# Patient Record
Sex: MALE | Race: WHITE
[De-identification: names, ages, dates, MRNs, and addresses within clinical notes are randomized per-mention and may not be internally consistent; named-entity substitution may affect disease eponyms.]

---

## 2017-12-19 ENCOUNTER — HOSPITAL ENCOUNTER (OUTPATIENT)
Dept: HOSPITAL 63 - PMG | Age: 67
Discharge: HOME | End: 2017-12-19
Attending: PHYSICIAN ASSISTANT
Payer: MEDICARE

## 2017-12-19 DIAGNOSIS — R05: Primary | ICD-10-CM

## 2017-12-19 DIAGNOSIS — R09.89: ICD-10-CM

## 2017-12-19 PROCEDURE — 71020: CPT

## 2017-12-19 NOTE — RAD
Indication: Cough and congestion.



Time of exam 0824 hours.



No prior studies are available for comparison.



FINDINGS: The heart size is normal. The lungs are clear. No pleural effusion

or pneumothorax is identified.  The pulmonary vascularity is normal.



IMPRESSION: No acute abnormality detected.

## 2018-02-08 ENCOUNTER — HOSPITAL ENCOUNTER (OUTPATIENT)
Dept: HOSPITAL 63 - US | Age: 68
Discharge: HOME | End: 2018-02-08
Attending: INTERNAL MEDICINE
Payer: MEDICARE

## 2018-02-08 DIAGNOSIS — E78.00: ICD-10-CM

## 2018-02-08 DIAGNOSIS — I65.23: Primary | ICD-10-CM

## 2018-02-08 PROCEDURE — 93880 EXTRACRANIAL BILAT STUDY: CPT

## 2018-02-08 NOTE — RAD
Carotid ultrasound, 2/8/2018:



History: Carotid stenosis



Duplex evaluation of the carotid arteries in neck was performed including

grayscale, color-flow and spectral Doppler analysis.



There is mild intimal thickening and smooth plaquing at both carotid

bifurcations.  Minimal plaque calcification is noted.  The peak systolic

velocity in the right internal carotid artery is 134 cm/s with an

end-diastolic velocity of 31 cm/s.  The internal carotid to common carotid

artery ratio is 1.0.  These Doppler findings suggest narrowing in the 0-50%

diameter range.



The peak systolic velocity in left internal carotid artery is 90 cm/s with an

end-diastolic velocity of 29 cm/s.  The internal carotid to common carotid

artery ratio on the left is 0.8.  These Doppler findings also suggest

narrowing in the 0-50% diameter range.



Antegrade flow is present in both vertebral arteries in the neck.





IMPRESSION: Mild atherosclerotic plaquing at both carotid bifurcations with

underlying luminal narrowing in the 0-50% diameter range bilaterally.





Note:  Stenosis calculations for CTA, MRA and conventional angiography are

based upon determination of the distal ICA diameter in accordance with the

NASCET methodology.  Stenosis calculations for Doppler studies are derived

from validated velocity criteria which are known to correlate with NASCET

methodology of determining stenosis.

## 2020-01-06 ENCOUNTER — HOSPITAL ENCOUNTER (OUTPATIENT)
Dept: HOSPITAL 63 - LAB | Age: 70
Discharge: HOME | End: 2020-01-06
Attending: INTERNAL MEDICINE
Payer: MEDICARE

## 2020-01-06 DIAGNOSIS — E78.00: ICD-10-CM

## 2020-01-06 DIAGNOSIS — I49.3: Primary | ICD-10-CM

## 2020-01-06 LAB
ALBUMIN SERPL-MCNC: 4.1 G/DL (ref 3.4–5)
ALBUMIN/GLOB SERPL: 1.4 {RATIO} (ref 1–1.7)
ALP SERPL-CCNC: 55 U/L (ref 46–116)
ALT SERPL-CCNC: 43 U/L (ref 16–63)
ANION GAP SERPL CALC-SCNC: 8 MMOL/L (ref 6–14)
AST SERPL-CCNC: 20 U/L (ref 15–37)
BILIRUB SERPL-MCNC: 1.4 MG/DL (ref 0.2–1)
BUN/CREAT SERPL: 26 (ref 6–20)
CA-I SERPL ISE-MCNC: 21 MG/DL (ref 8–26)
CALCIUM SERPL-MCNC: 9.1 MG/DL (ref 8.5–10.1)
CHLORIDE SERPL-SCNC: 107 MMOL/L (ref 98–107)
CHOLEST/HDLC SERPL: 2 {RATIO}
CO2 SERPL-SCNC: 26 MMOL/L (ref 21–32)
CREAT SERPL-MCNC: 0.8 MG/DL (ref 0.7–1.3)
GFR SERPLBLD BASED ON 1.73 SQ M-ARVRAT: 95.3 ML/MIN
GLOBULIN SER-MCNC: 3 G/DL (ref 2.2–3.8)
GLUCOSE SERPL-MCNC: 103 MG/DL (ref 70–99)
HDLC SERPL-MCNC: 56 MG/DL (ref 40–60)
LDLC: 77 MG/DL (ref 0–100)
MAGNESIUM SERPL-MCNC: 2.1 MG/DL (ref 1.8–2.4)
POTASSIUM SERPL-SCNC: 4.4 MMOL/L (ref 3.5–5.1)
PROT SERPL-MCNC: 7.1 G/DL (ref 6.4–8.2)
SODIUM SERPL-SCNC: 141 MMOL/L (ref 136–145)
TRIGL SERPL-MCNC: 82 MG/DL (ref 0–150)
VLDLC: 16 MG/DL (ref 0–40)

## 2020-01-06 PROCEDURE — 36415 COLL VENOUS BLD VENIPUNCTURE: CPT

## 2020-01-06 PROCEDURE — 83735 ASSAY OF MAGNESIUM: CPT

## 2020-01-06 PROCEDURE — 80053 COMPREHEN METABOLIC PANEL: CPT

## 2020-01-06 PROCEDURE — 80061 LIPID PANEL: CPT

## 2020-04-13 ENCOUNTER — HOSPITAL ENCOUNTER (OUTPATIENT)
Dept: HOSPITAL 63 - LAB | Age: 70
Discharge: HOME | End: 2020-04-13
Attending: INTERNAL MEDICINE
Payer: MEDICARE

## 2020-04-13 DIAGNOSIS — E78.00: Primary | ICD-10-CM

## 2020-04-13 LAB
ALBUMIN SERPL-MCNC: 4.1 G/DL (ref 3.4–5)
ALP SERPL-CCNC: 64 U/L (ref 46–116)
ALT SERPL-CCNC: 32 U/L (ref 16–63)
AST SERPL-CCNC: 17 U/L (ref 15–37)
BILIRUB DIRECT SERPL-MCNC: 0.2 MG/DL (ref 0–0.2)
BILIRUB SERPL-MCNC: 1.3 MG/DL (ref 0.2–1)
CHOLEST/HDLC SERPL: 2 {RATIO}
HDLC SERPL-MCNC: 54 MG/DL (ref 40–60)
LDLC: 69 MG/DL (ref 0–100)
PROT SERPL-MCNC: 7 G/DL (ref 6.4–8.2)
TRIGL SERPL-MCNC: 102 MG/DL (ref 0–150)
VLDLC: 20 MG/DL (ref 0–40)

## 2020-04-13 PROCEDURE — 36415 COLL VENOUS BLD VENIPUNCTURE: CPT

## 2020-04-13 PROCEDURE — 80076 HEPATIC FUNCTION PANEL: CPT

## 2020-04-13 PROCEDURE — 80061 LIPID PANEL: CPT

## 2020-08-14 ENCOUNTER — HOSPITAL ENCOUNTER (OUTPATIENT)
Dept: HOSPITAL 63 - LAB | Age: 70
Discharge: HOME | End: 2020-08-14
Attending: INTERNAL MEDICINE
Payer: MEDICARE

## 2020-08-14 DIAGNOSIS — E78.00: ICD-10-CM

## 2020-08-14 DIAGNOSIS — I35.0: Primary | ICD-10-CM

## 2020-08-14 LAB
ALBUMIN SERPL-MCNC: 4.1 G/DL (ref 3.4–5)
ALBUMIN/GLOB SERPL: 1.4 {RATIO} (ref 1–1.7)
ALP SERPL-CCNC: 55 U/L (ref 46–116)
ALT SERPL-CCNC: 38 U/L (ref 16–63)
ANION GAP SERPL CALC-SCNC: 10 MMOL/L (ref 6–14)
AST SERPL-CCNC: 19 U/L (ref 15–37)
BILIRUB SERPL-MCNC: 1.3 MG/DL (ref 0.2–1)
BUN/CREAT SERPL: 21 (ref 6–20)
CA-I SERPL ISE-MCNC: 19 MG/DL (ref 8–26)
CALCIUM SERPL-MCNC: 9.2 MG/DL (ref 8.5–10.1)
CHLORIDE SERPL-SCNC: 106 MMOL/L (ref 98–107)
CHOLEST/HDLC SERPL: 2 {RATIO}
CO2 SERPL-SCNC: 25 MMOL/L (ref 21–32)
CREAT SERPL-MCNC: 0.9 MG/DL (ref 0.7–1.3)
GFR SERPLBLD BASED ON 1.73 SQ M-ARVRAT: 82.9 ML/MIN
GLOBULIN SER-MCNC: 3 G/DL (ref 2.2–3.8)
GLUCOSE SERPL-MCNC: 114 MG/DL (ref 70–99)
HDLC SERPL-MCNC: 56 MG/DL (ref 40–60)
LDLC: 59 MG/DL (ref 0–100)
POTASSIUM SERPL-SCNC: 4.5 MMOL/L (ref 3.5–5.1)
PROT SERPL-MCNC: 7.1 G/DL (ref 6.4–8.2)
SODIUM SERPL-SCNC: 141 MMOL/L (ref 136–145)
TRIGL SERPL-MCNC: 66 MG/DL (ref 0–150)
VLDLC: 13 MG/DL (ref 0–40)

## 2020-08-14 PROCEDURE — 80053 COMPREHEN METABOLIC PANEL: CPT

## 2020-08-14 PROCEDURE — 80061 LIPID PANEL: CPT

## 2020-08-14 PROCEDURE — 83695 ASSAY OF LIPOPROTEIN(A): CPT

## 2021-01-25 ENCOUNTER — HOSPITAL ENCOUNTER (INPATIENT)
Dept: HOSPITAL 63 - 1 SOUTH | Age: 71
LOS: 7 days | Discharge: HOME HEALTH | DRG: 177 | End: 2021-02-01
Attending: INTERNAL MEDICINE | Admitting: HOSPITALIST
Payer: MEDICARE

## 2021-01-25 VITALS — BODY MASS INDEX: 25.42 KG/M2 | WEIGHT: 191.8 LBS | HEIGHT: 73 IN

## 2021-01-25 VITALS — DIASTOLIC BLOOD PRESSURE: 71 MMHG | SYSTOLIC BLOOD PRESSURE: 115 MMHG

## 2021-01-25 VITALS — SYSTOLIC BLOOD PRESSURE: 121 MMHG | DIASTOLIC BLOOD PRESSURE: 70 MMHG

## 2021-01-25 DIAGNOSIS — E78.5: ICD-10-CM

## 2021-01-25 DIAGNOSIS — I35.0: ICD-10-CM

## 2021-01-25 DIAGNOSIS — J12.82: ICD-10-CM

## 2021-01-25 DIAGNOSIS — I11.0: ICD-10-CM

## 2021-01-25 DIAGNOSIS — I50.9: ICD-10-CM

## 2021-01-25 DIAGNOSIS — J84.89: ICD-10-CM

## 2021-01-25 DIAGNOSIS — Z79.899: ICD-10-CM

## 2021-01-25 DIAGNOSIS — Z82.49: ICD-10-CM

## 2021-01-25 DIAGNOSIS — J96.01: ICD-10-CM

## 2021-01-25 DIAGNOSIS — U07.1: Primary | ICD-10-CM

## 2021-01-25 DIAGNOSIS — Z90.49: ICD-10-CM

## 2021-01-25 DIAGNOSIS — N40.0: ICD-10-CM

## 2021-01-25 LAB
ALBUMIN SERPL-MCNC: 2.7 G/DL (ref 3.4–5)
ALBUMIN/GLOB SERPL: 0.6 {RATIO} (ref 1–1.7)
ALP SERPL-CCNC: 103 U/L (ref 46–116)
ALT SERPL-CCNC: 221 U/L (ref 16–63)
ANION GAP SERPL CALC-SCNC: 10 MMOL/L (ref 6–14)
AST SERPL-CCNC: 64 U/L (ref 15–37)
BASOPHILS # BLD AUTO: 0.1 X10^3/UL (ref 0–0.2)
BASOPHILS NFR BLD: 1 % (ref 0–3)
BILIRUB SERPL-MCNC: 0.8 MG/DL (ref 0.2–1)
BUN/CREAT SERPL: 18 (ref 6–20)
CA-I SERPL ISE-MCNC: 16 MG/DL (ref 8–26)
CALCIUM SERPL-MCNC: 8.9 MG/DL (ref 8.5–10.1)
CHLORIDE SERPL-SCNC: 103 MMOL/L (ref 98–107)
CO2 SERPL-SCNC: 23 MMOL/L (ref 21–32)
CREAT SERPL-MCNC: 0.9 MG/DL (ref 0.7–1.3)
EOSINOPHIL NFR BLD: 0.1 X10^3/UL (ref 0–0.7)
EOSINOPHIL NFR BLD: 1 % (ref 0–3)
ERYTHROCYTE [DISTWIDTH] IN BLOOD BY AUTOMATED COUNT: 12.6 % (ref 11.5–14.5)
GFR SERPLBLD BASED ON 1.73 SQ M-ARVRAT: 82.9 ML/MIN
GLOBULIN SER-MCNC: 4.3 G/DL (ref 2.2–3.8)
GLUCOSE SERPL-MCNC: 123 MG/DL (ref 70–99)
HCT VFR BLD CALC: 40.9 % (ref 39–53)
HGB BLD-MCNC: 13.7 G/DL (ref 13–17.5)
INFLUENZA A PATIENT: NEGATIVE
INFLUENZA B PATIENT: NEGATIVE
LYMPHOCYTES # BLD: 1 X10^3/UL (ref 1–4.8)
LYMPHOCYTES NFR BLD AUTO: 8 % (ref 24–48)
MCH RBC QN AUTO: 30 PG (ref 25–35)
MCHC RBC AUTO-ENTMCNC: 34 G/DL (ref 31–37)
MCV RBC AUTO: 91 FL (ref 79–100)
MONO #: 0.7 X10^3/UL (ref 0–1.1)
MONOCYTES NFR BLD: 5 % (ref 0–9)
NEUT #: 10.9 X10^3UL (ref 1.8–7.7)
NEUTROPHILS NFR BLD AUTO: 85 % (ref 31–73)
PLATELET # BLD AUTO: 474 X10^3/UL (ref 140–400)
POTASSIUM SERPL-SCNC: 4.1 MMOL/L (ref 3.5–5.1)
PROT SERPL-MCNC: 7 G/DL (ref 6.4–8.2)
RBC # BLD AUTO: 4.5 X10^6/UL (ref 4.3–5.7)
SODIUM SERPL-SCNC: 136 MMOL/L (ref 136–145)
WBC # BLD AUTO: 12.8 X10^3/UL (ref 4–11)

## 2021-01-25 PROCEDURE — U0003 INFECTIOUS AGENT DETECTION BY NUCLEIC ACID (DNA OR RNA); SEVERE ACUTE RESPIRATORY SYNDROME CORONAVIRUS 2 (SARS-COV-2) (CORONAVIRUS DISEASE [COVID-19]), AMPLIFIED PROBE TECHNIQUE, MAKING USE OF HIGH THROUGHPUT TECHNOLOGIES AS DESCRIBED BY CMS-2020-01-R: HCPCS

## 2021-01-25 PROCEDURE — 94618 PULMONARY STRESS TESTING: CPT

## 2021-01-25 PROCEDURE — 71045 X-RAY EXAM CHEST 1 VIEW: CPT

## 2021-01-25 PROCEDURE — 71046 X-RAY EXAM CHEST 2 VIEWS: CPT

## 2021-01-25 PROCEDURE — 87040 BLOOD CULTURE FOR BACTERIA: CPT

## 2021-01-25 PROCEDURE — 86140 C-REACTIVE PROTEIN: CPT

## 2021-01-25 PROCEDURE — 87804 INFLUENZA ASSAY W/OPTIC: CPT

## 2021-01-25 PROCEDURE — 85379 FIBRIN DEGRADATION QUANT: CPT

## 2021-01-25 PROCEDURE — 85007 BL SMEAR W/DIFF WBC COUNT: CPT

## 2021-01-25 PROCEDURE — 71275 CT ANGIOGRAPHY CHEST: CPT

## 2021-01-25 PROCEDURE — 36415 COLL VENOUS BLD VENIPUNCTURE: CPT

## 2021-01-25 PROCEDURE — 83880 ASSAY OF NATRIURETIC PEPTIDE: CPT

## 2021-01-25 PROCEDURE — 80048 BASIC METABOLIC PNL TOTAL CA: CPT

## 2021-01-25 PROCEDURE — 85025 COMPLETE CBC W/AUTO DIFF WBC: CPT

## 2021-01-25 PROCEDURE — 85027 COMPLETE CBC AUTOMATED: CPT

## 2021-01-25 PROCEDURE — 80053 COMPREHEN METABOLIC PANEL: CPT

## 2021-01-25 RX ADMIN — IPRATROPIUM BROMIDE AND ALBUTEROL SCH PUFF: 20; 100 SPRAY, METERED RESPIRATORY (INHALATION) at 20:42

## 2021-01-25 RX ADMIN — DEXAMETHASONE SODIUM PHOSPHATE SCH MG: 10 INJECTION INTRAMUSCULAR; INTRAVENOUS at 20:43

## 2021-01-25 RX ADMIN — AZITHROMYCIN DIHYDRATE SCH MLS/HR: 500 INJECTION, POWDER, LYOPHILIZED, FOR SOLUTION INTRAVENOUS at 21:18

## 2021-01-26 VITALS — SYSTOLIC BLOOD PRESSURE: 124 MMHG | DIASTOLIC BLOOD PRESSURE: 69 MMHG

## 2021-01-26 VITALS — SYSTOLIC BLOOD PRESSURE: 128 MMHG | DIASTOLIC BLOOD PRESSURE: 77 MMHG

## 2021-01-26 VITALS — DIASTOLIC BLOOD PRESSURE: 78 MMHG | SYSTOLIC BLOOD PRESSURE: 128 MMHG

## 2021-01-26 VITALS — SYSTOLIC BLOOD PRESSURE: 133 MMHG | DIASTOLIC BLOOD PRESSURE: 78 MMHG

## 2021-01-26 VITALS — DIASTOLIC BLOOD PRESSURE: 74 MMHG | SYSTOLIC BLOOD PRESSURE: 115 MMHG

## 2021-01-26 VITALS — SYSTOLIC BLOOD PRESSURE: 120 MMHG | DIASTOLIC BLOOD PRESSURE: 73 MMHG

## 2021-01-26 RX ADMIN — IPRATROPIUM BROMIDE AND ALBUTEROL SCH PUFF: 20; 100 SPRAY, METERED RESPIRATORY (INHALATION) at 09:34

## 2021-01-26 RX ADMIN — PSEUDOEPHEDRINE HYDROCHLORIDE SCH MG: 120 TABLET, FILM COATED, EXTENDED RELEASE ORAL at 14:00

## 2021-01-26 RX ADMIN — IPRATROPIUM BROMIDE AND ALBUTEROL SCH PUFF: 20; 100 SPRAY, METERED RESPIRATORY (INHALATION) at 20:00

## 2021-01-26 RX ADMIN — Medication SCH MCG: at 14:23

## 2021-01-26 RX ADMIN — FINASTERIDE SCH MG: 5 TABLET, FILM COATED ORAL at 20:30

## 2021-01-26 RX ADMIN — PSEUDOEPHEDRINE HYDROCHLORIDE SCH MG: 120 TABLET, FILM COATED, EXTENDED RELEASE ORAL at 20:32

## 2021-01-26 RX ADMIN — MULTIPLE VITAMINS W/ MINERALS TAB SCH TAB: TAB at 14:22

## 2021-01-26 RX ADMIN — DEXAMETHASONE SODIUM PHOSPHATE SCH MG: 10 INJECTION INTRAMUSCULAR; INTRAVENOUS at 09:34

## 2021-01-26 RX ADMIN — AZITHROMYCIN DIHYDRATE SCH MLS/HR: 500 INJECTION, POWDER, LYOPHILIZED, FOR SOLUTION INTRAVENOUS at 20:30

## 2021-01-26 RX ADMIN — ATORVASTATIN CALCIUM SCH MG: 20 TABLET, FILM COATED ORAL at 20:30

## 2021-01-26 RX ADMIN — GUAIFENESIN AND DEXTROMETHORPHAN HYDROBROMIDE PRN TAB: 600; 30 TABLET, EXTENDED RELEASE ORAL at 14:23

## 2021-01-26 RX ADMIN — DEXAMETHASONE SODIUM PHOSPHATE SCH MG: 10 INJECTION INTRAMUSCULAR; INTRAVENOUS at 20:30

## 2021-01-26 RX ADMIN — IPRATROPIUM BROMIDE AND ALBUTEROL SCH PUFF: 20; 100 SPRAY, METERED RESPIRATORY (INHALATION) at 16:00

## 2021-01-26 RX ADMIN — CETIRIZINE HYDROCHLORIDE SCH MG: 10 TABLET, FILM COATED ORAL at 14:22

## 2021-01-26 RX ADMIN — Medication SCH PKT: at 20:32

## 2021-01-26 RX ADMIN — IPRATROPIUM BROMIDE AND ALBUTEROL SCH PUFF: 20; 100 SPRAY, METERED RESPIRATORY (INHALATION) at 12:00

## 2021-01-26 RX ADMIN — VITAMIN D, TAB 1000IU (100/BT) SCH UNIT: 25 TAB at 14:23

## 2021-01-26 RX ADMIN — ASPIRIN 81 MG SCH MG: 81 TABLET ORAL at 14:22

## 2021-01-26 RX ADMIN — ACETAMINOPHEN PRN MG: 325 TABLET, FILM COATED ORAL at 14:23

## 2021-01-26 RX ADMIN — TAMSULOSIN HYDROCHLORIDE SCH MG: 0.4 CAPSULE ORAL at 20:30

## 2021-01-26 NOTE — HP
ADMIT DATE:  



ATTENDING PHYSICIAN:  Dr. Reeves.



CHIEF COMPLAINT:  Shortness of breath.



HISTORY OF PRESENT ILLNESS:  The patient is a 70-year-old gentleman, very

active, comes in with weakness and shortness of breath.  He was tested positive

for coronavirus 11 days ago.  Chest x-ray demonstrated chronic interstitial

changes as well as new diffuse interstitial ground glass appearance in both

bases.  The patient is therefore admitted with suspected coronavirus pneumonia. 

Influenza A and B has been ordered too.



This gentleman has been sick for the last 4 days.  Not much appetite.  No

fevers, chills, cough.  Just malaise, some myalgias, poor appetite.  He had some

dyspnea.  No cough.  However, by the time I saw him, his room air saturations

were quite adequate.



PAST MEDICAL HISTORY:  Significant for gallbladder surgery.  He had essential

hypertension, hyperlipidemia and some prostatism.



CURRENT MEDICATIONS:  Include the following:  Aspirin, Lipitor, Coreg, vitamin

D3, B12, cyclobenzaprine, diltiazem 180, Proscar, multivitamin, and Flomax.



ALLERGIES:  He has no known drug allergies.



SOCIAL HISTORY:  Nonsmoker, nondrinker.



SOCIAL HISTORY:  He is retired.



FAMILY HISTORY:  Mom lived at age 97.  Dad  at age 71 of complications of

drug-induced cirrhosis of liver from rheumatoid arthritis history.  He is

.  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  He is still active and works at farms

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



REVIEW OF SYSTEMS:  Significant for the issues that brought him here.  He has

been fairly active.  He was feeling a little better by the time I saw him the

next day.  All other systems reviewed and determined to be negative.



PHYSICAL EXAMINATION:

GENERAL:  When I saw him, this is a pleasant, middle-aged gentleman.

INITIAL VITAL SIGNS:  Showed a blood pressure 120/73, pulse 88 and regular,

temperature 97.9 degrees Fahrenheit, and his oxygen saturations were 94% on room

air.

HEENT:  Head is without trauma.  Pupils are reactive.  Sclerae nonicteric. 

Oropharynx is clear.

NECK:  Supple, no bruits identified.

LUNGS:  Coarse rhonchi at the bases.

CARDIOVASCULAR:  Showed regular heart tones.  No gallops.

ABDOMEN:  Soft, no guarding or rebound tenderness.

EXTREMITIES:  Showed no cyanosis or edema.

NEUROLOGIC:  Focally intact.  Speech is fluent.   symmetrical.

SKIN:  Warm and dry.



IMAGING:  Chest x-ray as noted.



PERTINENT LABORATORY STUDIES:  The hemoglobin is 13.7 g/dL with a white count of

12,800.  Electrolytes, BUN and creatinine all within normal range. 

Transaminases are slightly elevated with ALT of 221, AST of 64, bilirubin is

normal.  Nonfasting blood sugar 123 mg/dL, creatinine is 0.9 mg/dL.



ASSESSMENT:

1.  A 70-year-old gentleman with bilateral atypical pneumonia, both bases.

2.  Interstitial lung disease.

3.  Probable COVID-19 pneumonia infection.

4.  Essential hypertension.

5.  Hyperlipidemia.



PLAN:

1.  Admit to the inpatient unit.

2.  Supplemental oxygen as needed.

3.  Empiric intravenous antibiotics, Rocephin and Zithromax have been ordered.

4.  Empiric Decadron.

5.  Diet as tolerated.

6.  Influenza A and B swab.

7.  Repeat coronavirus swab.





______________________________

CATRACHITO REEVES MD



DR:  YESSICA/jeremy  JOB#:  [id number] / [id number]

DD:  2021 09:01  DT:  2021 09:36



MARY Peerz

## 2021-01-27 VITALS — DIASTOLIC BLOOD PRESSURE: 74 MMHG | SYSTOLIC BLOOD PRESSURE: 122 MMHG

## 2021-01-27 VITALS — SYSTOLIC BLOOD PRESSURE: 138 MMHG | DIASTOLIC BLOOD PRESSURE: 77 MMHG

## 2021-01-27 VITALS — SYSTOLIC BLOOD PRESSURE: 146 MMHG | DIASTOLIC BLOOD PRESSURE: 79 MMHG

## 2021-01-27 VITALS — DIASTOLIC BLOOD PRESSURE: 75 MMHG | SYSTOLIC BLOOD PRESSURE: 138 MMHG

## 2021-01-27 VITALS — DIASTOLIC BLOOD PRESSURE: 74 MMHG | SYSTOLIC BLOOD PRESSURE: 115 MMHG

## 2021-01-27 LAB
% LYMPHS: 5 % (ref 24–48)
% MONOS: 4 % (ref 0–10)
% SEGS: 91 % (ref 35–66)
ALBUMIN SERPL-MCNC: 2.7 G/DL (ref 3.4–5)
ALBUMIN/GLOB SERPL: 0.7 {RATIO} (ref 1–1.7)
ALP SERPL-CCNC: 85 U/L (ref 46–116)
ALT SERPL-CCNC: 188 U/L (ref 16–63)
ANION GAP SERPL CALC-SCNC: 9 MMOL/L (ref 6–14)
AST SERPL-CCNC: 41 U/L (ref 15–37)
BASOPHILS # BLD AUTO: 0 X10^3/UL (ref 0–0.2)
BASOPHILS NFR BLD: 0 % (ref 0–3)
BILIRUB SERPL-MCNC: 0.5 MG/DL (ref 0.2–1)
BUN/CREAT SERPL: 19 (ref 6–20)
CA-I SERPL ISE-MCNC: 17 MG/DL (ref 8–26)
CALCIUM SERPL-MCNC: 8.8 MG/DL (ref 8.5–10.1)
CHLORIDE SERPL-SCNC: 104 MMOL/L (ref 98–107)
CO2 SERPL-SCNC: 24 MMOL/L (ref 21–32)
CREAT SERPL-MCNC: 0.9 MG/DL (ref 0.7–1.3)
EOSINOPHIL NFR BLD: 0 % (ref 0–3)
EOSINOPHIL NFR BLD: 0 X10^3/UL (ref 0–0.7)
ERYTHROCYTE [DISTWIDTH] IN BLOOD BY AUTOMATED COUNT: 13.2 % (ref 11.5–14.5)
GFR SERPLBLD BASED ON 1.73 SQ M-ARVRAT: 82.9 ML/MIN
GLOBULIN SER-MCNC: 4.1 G/DL (ref 2.2–3.8)
GLUCOSE SERPL-MCNC: 152 MG/DL (ref 70–99)
HCT VFR BLD CALC: 38.3 % (ref 39–53)
HGB BLD-MCNC: 13 G/DL (ref 13–17.5)
LYMPHOCYTES # BLD: 1.1 X10^3/UL (ref 1–4.8)
LYMPHOCYTES NFR BLD AUTO: 7 % (ref 24–48)
MCH RBC QN AUTO: 30 PG (ref 25–35)
MCHC RBC AUTO-ENTMCNC: 34 G/DL (ref 31–37)
MCV RBC AUTO: 89 FL (ref 79–100)
MONO #: 0.5 X10^3/UL (ref 0–1.1)
MONOCYTES NFR BLD: 3 % (ref 0–9)
NEUT #: 15 X10^3UL (ref 1.8–7.7)
NEUTROPHILS NFR BLD AUTO: 90 % (ref 31–73)
PLATELET # BLD AUTO: 407 X10^3/UL (ref 140–400)
PLATELET # BLD EST: (no result) 10*3/UL
POTASSIUM SERPL-SCNC: 4.5 MMOL/L (ref 3.5–5.1)
PROT SERPL-MCNC: 6.8 G/DL (ref 6.4–8.2)
RBC # BLD AUTO: 4.28 X10^6/UL (ref 4.3–5.7)
SODIUM SERPL-SCNC: 137 MMOL/L (ref 136–145)
TOXIC GRANULES BLD QL SMEAR: PRESENT
WBC # BLD AUTO: 16.6 X10^3/UL (ref 4–11)
WBC TOXIC VACUOLES BLD QL SMEAR: PRESENT

## 2021-01-27 RX ADMIN — CETIRIZINE HYDROCHLORIDE SCH MG: 10 TABLET, FILM COATED ORAL at 08:31

## 2021-01-27 RX ADMIN — ASPIRIN 81 MG SCH MG: 81 TABLET ORAL at 08:32

## 2021-01-27 RX ADMIN — IPRATROPIUM BROMIDE AND ALBUTEROL SCH PUFF: 20; 100 SPRAY, METERED RESPIRATORY (INHALATION) at 16:00

## 2021-01-27 RX ADMIN — VITAMIN D, TAB 1000IU (100/BT) SCH UNIT: 25 TAB at 08:31

## 2021-01-27 RX ADMIN — Medication SCH MCG: at 08:32

## 2021-01-27 RX ADMIN — AZITHROMYCIN DIHYDRATE SCH MLS/HR: 500 INJECTION, POWDER, LYOPHILIZED, FOR SOLUTION INTRAVENOUS at 22:15

## 2021-01-27 RX ADMIN — FINASTERIDE SCH MG: 5 TABLET, FILM COATED ORAL at 20:52

## 2021-01-27 RX ADMIN — DEXAMETHASONE SODIUM PHOSPHATE SCH MG: 10 INJECTION INTRAMUSCULAR; INTRAVENOUS at 20:53

## 2021-01-27 RX ADMIN — ACETAMINOPHEN PRN MG: 325 TABLET, FILM COATED ORAL at 20:53

## 2021-01-27 RX ADMIN — IPRATROPIUM BROMIDE AND ALBUTEROL SCH PUFF: 20; 100 SPRAY, METERED RESPIRATORY (INHALATION) at 20:00

## 2021-01-27 RX ADMIN — Medication SCH PKT: at 08:32

## 2021-01-27 RX ADMIN — MULTIPLE VITAMINS W/ MINERALS TAB SCH TAB: TAB at 08:32

## 2021-01-27 RX ADMIN — Medication SCH CAP: at 20:52

## 2021-01-27 RX ADMIN — IPRATROPIUM BROMIDE AND ALBUTEROL SCH PUFF: 20; 100 SPRAY, METERED RESPIRATORY (INHALATION) at 12:00

## 2021-01-27 RX ADMIN — PSEUDOEPHEDRINE HYDROCHLORIDE SCH MG: 120 TABLET, FILM COATED, EXTENDED RELEASE ORAL at 08:33

## 2021-01-27 RX ADMIN — IPRATROPIUM BROMIDE AND ALBUTEROL SCH PUFF: 20; 100 SPRAY, METERED RESPIRATORY (INHALATION) at 08:31

## 2021-01-27 RX ADMIN — ATORVASTATIN CALCIUM SCH MG: 20 TABLET, FILM COATED ORAL at 20:52

## 2021-01-27 RX ADMIN — Medication SCH CAP: at 09:00

## 2021-01-27 RX ADMIN — PSEUDOEPHEDRINE HYDROCHLORIDE SCH MG: 120 TABLET, FILM COATED, EXTENDED RELEASE ORAL at 20:53

## 2021-01-27 RX ADMIN — TAMSULOSIN HYDROCHLORIDE SCH MG: 0.4 CAPSULE ORAL at 20:52

## 2021-01-27 RX ADMIN — DEXAMETHASONE SODIUM PHOSPHATE SCH MG: 10 INJECTION INTRAMUSCULAR; INTRAVENOUS at 08:31

## 2021-01-27 RX ADMIN — Medication SCH PKT: at 20:52

## 2021-01-27 NOTE — RAD
XR CHEST 1V



Clinical Indication: Reason: increased SOA covid related / 



Comparison:  Two-view chest, 2 days ago.



Findings: 

The cardiomediastinal silhouette is normal. Relatively diffuse interstitial and alveolar opacities in
 the lungs are unchanged.

There is no pneumothorax. No pleural effusion is appreciated. No acute bone abnormality.



IMPRESSION: 

Bilateral infiltrates are unchanged.





Electronically signed by: Bhavesh Cartre MD (1/27/2021 7:08 AM) [facility]

## 2021-01-27 NOTE — PN
DATE:  01/27/2021



SUBJECTIVE:  The patient is a 70-year-old  male patient who was

admitted with shortness of breath.  He apparently tested positive for

coronavirus 12 days ago and his x-ray demonstrated chronic interstitial changes

as well as new diffuse interstitial ground glass appearance in both bases and

therefore was admitted for suspected coronavirus pneumonia, influenza A and B

has been ordered too.  Apparently, his influenza A and B were negative and his

coronavirus by PCR was not detectable.  He was started on IV antibiotic in the

form of Rocephin and Zithromax as well as dexamethasone and when I saw him

today, he was continued to complain of shortness of breath.  He was unable to

finish sentences and has to pause for every now and then and clearly short of

breath, has also cough with whitish sputum with streaks of blood.  Denied any

chest pain.



PHYSICAL EXAMINATION:

GENERAL:  When I saw him this afternoon, he looked well, slightly pale, no

jaundice, cyanosis or thyromegaly.  No jugular venous distention or limb edema.

VITAL SIGNS:  Her heart rate was 107, blood pressure was 146/79, temperature was

99.4, respiratory rate 20, and oxygen saturation was 94% on room air.

HEAD, EYES, EARS, NOSE AND THROAT:  Showed normocephalic, atraumatic.

NECK:  Supple.

HEART:  Normal first and second heart sounds with harsh ejection systolic murmur

best heard in the second right intercostal space, radiating to both carotids.

CHEST:  Shows central trachea, equal bilateral expansion, air entry, vesicular

breath sounds.  I could not really appreciate any crepitation or rhonchi.

ABDOMEN:  Slightly distended, soft, nontender.

NEUROLOGIC:  He is awake, alert, responding appropriately.  All cranial nerves

are intact.  He moves extremities without difficulty, ambulates without

assistance or assistive devices.



His intake over the last 24 hours was 940, no output was recorded.



LABORATORY DATA:  His lab work this morning showed white cell count of 16,600,

hemoglobin 13, hematocrit 38, MCV 89 and platelet count of 407,000 with a manual

differential showed 90% polymorphs, 7% lymphocytes and 3% monocytes.  Serum

sodium was 137, potassium 4.5, chloride 104, bicarbonate 24, anion gap of 9, BUN

17, creatinine 0.9, estimated GFR was 83 mL per minute.  His glucose was 152,

calcium was 8.8.  Total bilirubin and alkaline phosphatase is normal.  AST, ALT

slightly elevated, but trending down.  His total protein was 6.8, albumin was

2.7.  His chest x-ray showed the cardiomediastinal silhouette is normal

relatively diffuse interstitial and alveolar opacities in lungs that are

unchanged.  There is no pneumothorax, no pleural effusion is appreciated, no

acute bony abnormality.



ASSESSMENT:

1.  COVID pneumonia.

2.  Bilateral atypical pneumonia.

3.  Chronic interstitial lung disease.

4.  Hypertension.

5.  Hyperlipidemia.



PLAN:  To continue with IV antibiotic in the form of Rocephin and Zithromax,

continue with Decadron.  We will obviously start him on oxygen supplementation

as needed.  Meanwhile, we will continue with his tamsulosin and finasteride for

benign prostatic hypertrophy and diltiazem for hypertension.  The patient is

known to have aortic valve disease followed by Dr. Herman.  The patient seems to

be short of breath.  I will repeat his lab works including a D-dimer and BMP.  I

will consult also the cardiologist.  The patient does not seem to be hypoxic at

rest, but he definitely has short of breath more than usual according to him, it

is clearly he is unable to finish sentences, which according to him this not his

usual state of health.





______________________________

JANI RAMIREZ MD DR:  HARJEET/jeremy  JOB#:  [id number] / [id number]

DD:  01/27/2021 13:08  DT:  01/27/2021 17:44

## 2021-01-28 VITALS — SYSTOLIC BLOOD PRESSURE: 128 MMHG | DIASTOLIC BLOOD PRESSURE: 75 MMHG

## 2021-01-28 VITALS — DIASTOLIC BLOOD PRESSURE: 68 MMHG | SYSTOLIC BLOOD PRESSURE: 127 MMHG

## 2021-01-28 VITALS — SYSTOLIC BLOOD PRESSURE: 129 MMHG | DIASTOLIC BLOOD PRESSURE: 75 MMHG

## 2021-01-28 VITALS — SYSTOLIC BLOOD PRESSURE: 122 MMHG | DIASTOLIC BLOOD PRESSURE: 68 MMHG

## 2021-01-28 VITALS — SYSTOLIC BLOOD PRESSURE: 127 MMHG | DIASTOLIC BLOOD PRESSURE: 73 MMHG

## 2021-01-28 LAB
ALBUMIN SERPL-MCNC: 2.6 G/DL (ref 3.4–5)
ALBUMIN/GLOB SERPL: 0.7 {RATIO} (ref 1–1.7)
ALP SERPL-CCNC: 78 U/L (ref 46–116)
ALT SERPL-CCNC: 187 U/L (ref 16–63)
ANION GAP SERPL CALC-SCNC: 9 MMOL/L (ref 6–14)
AST SERPL-CCNC: 42 U/L (ref 15–37)
BILIRUB SERPL-MCNC: 0.5 MG/DL (ref 0.2–1)
BUN/CREAT SERPL: 22 (ref 6–20)
CA-I SERPL ISE-MCNC: 20 MG/DL (ref 8–26)
CALCIUM SERPL-MCNC: 8.7 MG/DL (ref 8.5–10.1)
CHLORIDE SERPL-SCNC: 104 MMOL/L (ref 98–107)
CO2 SERPL-SCNC: 23 MMOL/L (ref 21–32)
CREAT SERPL-MCNC: 0.9 MG/DL (ref 0.7–1.3)
GFR SERPLBLD BASED ON 1.73 SQ M-ARVRAT: 82.9 ML/MIN
GLOBULIN SER-MCNC: 3.8 G/DL (ref 2.2–3.8)
GLUCOSE SERPL-MCNC: 136 MG/DL (ref 70–99)
POTASSIUM SERPL-SCNC: 4.8 MMOL/L (ref 3.5–5.1)
PROT SERPL-MCNC: 6.4 G/DL (ref 6.4–8.2)
SODIUM SERPL-SCNC: 136 MMOL/L (ref 136–145)

## 2021-01-28 RX ADMIN — ATORVASTATIN CALCIUM SCH MG: 20 TABLET, FILM COATED ORAL at 20:47

## 2021-01-28 RX ADMIN — AZITHROMYCIN DIHYDRATE SCH MLS/HR: 500 INJECTION, POWDER, LYOPHILIZED, FOR SOLUTION INTRAVENOUS at 21:57

## 2021-01-28 RX ADMIN — Medication SCH MCG: at 10:07

## 2021-01-28 RX ADMIN — VITAMIN D, TAB 1000IU (100/BT) SCH UNIT: 25 TAB at 10:07

## 2021-01-28 RX ADMIN — DEXAMETHASONE SODIUM PHOSPHATE SCH MG: 10 INJECTION INTRAMUSCULAR; INTRAVENOUS at 20:47

## 2021-01-28 RX ADMIN — PSEUDOEPHEDRINE HYDROCHLORIDE SCH MG: 120 TABLET, FILM COATED, EXTENDED RELEASE ORAL at 10:08

## 2021-01-28 RX ADMIN — DEXAMETHASONE SODIUM PHOSPHATE SCH MG: 10 INJECTION INTRAMUSCULAR; INTRAVENOUS at 10:09

## 2021-01-28 RX ADMIN — GUAIFENESIN AND DEXTROMETHORPHAN HYDROBROMIDE PRN TAB: 600; 30 TABLET, EXTENDED RELEASE ORAL at 10:11

## 2021-01-28 RX ADMIN — Medication SCH PKT: at 20:47

## 2021-01-28 RX ADMIN — ASPIRIN 81 MG SCH MG: 81 TABLET ORAL at 10:06

## 2021-01-28 RX ADMIN — CETIRIZINE HYDROCHLORIDE SCH MG: 10 TABLET, FILM COATED ORAL at 10:07

## 2021-01-28 RX ADMIN — AZITHROMYCIN DIHYDRATE SCH MLS/HR: 500 INJECTION, POWDER, LYOPHILIZED, FOR SOLUTION INTRAVENOUS at 21:47

## 2021-01-28 RX ADMIN — Medication SCH CAP: at 10:11

## 2021-01-28 RX ADMIN — IPRATROPIUM BROMIDE AND ALBUTEROL SCH PUFF: 20; 100 SPRAY, METERED RESPIRATORY (INHALATION) at 08:00

## 2021-01-28 RX ADMIN — IPRATROPIUM BROMIDE AND ALBUTEROL SCH PUFF: 20; 100 SPRAY, METERED RESPIRATORY (INHALATION) at 20:00

## 2021-01-28 RX ADMIN — FINASTERIDE SCH MG: 5 TABLET, FILM COATED ORAL at 20:47

## 2021-01-28 RX ADMIN — ACETAMINOPHEN PRN MG: 325 TABLET, FILM COATED ORAL at 10:13

## 2021-01-28 RX ADMIN — IPRATROPIUM BROMIDE AND ALBUTEROL SCH PUFF: 20; 100 SPRAY, METERED RESPIRATORY (INHALATION) at 12:00

## 2021-01-28 RX ADMIN — Medication SCH CAP: at 20:47

## 2021-01-28 RX ADMIN — Medication SCH PKT: at 10:11

## 2021-01-28 RX ADMIN — MULTIPLE VITAMINS W/ MINERALS TAB SCH TAB: TAB at 09:00

## 2021-01-28 RX ADMIN — IPRATROPIUM BROMIDE AND ALBUTEROL SCH PUFF: 20; 100 SPRAY, METERED RESPIRATORY (INHALATION) at 16:00

## 2021-01-28 RX ADMIN — PSEUDOEPHEDRINE HYDROCHLORIDE SCH MG: 120 TABLET, FILM COATED, EXTENDED RELEASE ORAL at 20:47

## 2021-01-28 RX ADMIN — TAMSULOSIN HYDROCHLORIDE SCH MG: 0.4 CAPSULE ORAL at 20:47

## 2021-01-28 NOTE — PN
DATE:  01/28/2021



SUBJECTIVE:  The patient is resting, slightly propped up in bed, in no apparent

distress.  He continued to have somewhat short of breath, unable to finish

sentence.  He also desaturates on exertion and this morning, he went to have a

shower and his oxygen saturation dropped down to 88%.  His cough seemed to be

more productive with clear sputum.  He used to have some tinged blood before. 

Denied any chest pain.



PHYSICAL EXAMINATION:

GENERAL:  When I examined him, he looked well.  There was no pallor, jaundice,

cyanosis or thyromegaly.  No jugular venous distention.  No lower limb edema.

VITAL SIGNS:  Her heart rate was 82, blood pressure was 122/68, temperature was

97.8, respiratory rate 20, and oxygen saturation was 93% on room air.

HEAD, EYES, EARS, NOTE AND THROAT:  Showed normocephalic, atraumatic.

NECK:  Supple.

HEART:  Normal first and second heart sounds.  No gallop or murmur.

CHEST:  Shows central trachea, equal bilateral expansion, air entry, vesicular

sounds.  I could not really appreciate any crepitation or rhonchi.

ABDOMEN:  Slightly distended, soft, nontender.

NEUROLOGIC:  He was grossly intact.



His intake was 1620.  No output was recorded.



LABORATORY DATA:  His lab work this morning showed, his serum sodium to be 136,

potassium 4.8, chloride 104, bicarbonate 23, anion gap of 9, BUN 20, creatinine

was 0.9, estimated GFR was 82 mL per minute.  His glucose 136, calcium was 8.7. 

Total bilirubin and alkaline phosphatase is normal.  AST, ALT slightly elevated.

 His beta natriuretic peptide was only 90.  Total protein was 6.4, albumin was

2.6.  His coronavirus PCR was positive on 01/11; however, it was undetectable on

01/25.  His influenza A and B were negative.  So far, all his blood cultures are

negative.  A repeat chest x-ray today showed that the patient has bilateral

peripheral predominant interstitial and airspace opacities that have increased

mildly since 01/27.  There is no pneumothorax or pleural effusion.  The heart is

not enlarged.  There is atherosclerotic calcification of the aorta.



ASSESSMENT:

1.  COVID pneumonia.

2.  Bilateral atypical pneumonia.

3.  Chronic interstitial lung disease.

4.  Hypertension.

5.  Hyperlipidemia.



PLAN:  My plan is to continue with IV antibiotic.  Continue with Decadron. 

Continue with finasteride and tamsulosin for his benign prostatic hypertrophy,

diltiazem for hypertension.  His BMP this morning was only 90 and he was seen by

the cardiologist and heart failure is unlikely given the BNP of only 90. My plan

is to arrange for him to have a CT angio of the chest.  I will repeat all his

lab work including CBC, CMP, D-dimer and C-reactive protein and decide on

further management accordingly.  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  Whether he has chronic

interstitial lung disease due to organic and nonorganic dust is something

obviously difficult to know.





______________________________

JANI RAMIREZ MD



DR:  HARJEET/jeremy  JOB#:  [id number] / [id number]

DD:  01/28/2021 15:36  DT:  01/28/2021 17:35

## 2021-01-28 NOTE — PDOC2
CARDIAC CONSULT


DATE OF CONSULT


DOS:


DATE: 1/28/21 


TIME: 08:23





REASON FOR CONSULT


Reason for Consult


AS


Chronic CHF





REFERRING PHYSICIAN


Referring Physician


Dr. Bedoya





SOURCE


Source:  Chart review, Patient





HPI


History of Present Illness


This is a 69 yo male who presented secondary to weakness, shortness of breath 

and elevated heart rate. Patient tested positives for COVID 11 days prior to 

arrival. He has been more weak and decreased appetite for the last 4 days. Also 

having some myalgias and shortness of breath. Does have a history of atrial 

stenosis and "irregular heart beat", which prompted this consult. Denies any 

history of AFIB and reports he has "been checked for that".  Follows closely 

[facility] Dr. [facility] Reports having echocardiogram this past 

December. He denies any chest pain, palpitations, diaphoresis, or nausea/v

omiting. Reports breathing has improved and he is feeling better since 

admission.





PAST MEDICAL HISTORY


Cardiovascular:  HTN, aortic stenosis


Renal/:  Other (urinary retention )





PAST SURGICAL HISTORY


Past Surgical History:  No pertinent history





FAMILY HISTORY


Family History:  Hypertension, Other (renal disease )





SOCIAL HISTORY


Smoke:  No


ALCOHOL:  none


Drugs:  None


Lives:  with Family





CURRENT MEDICATIONS


Current Medications





Current Medications


Albuterol/ Ipratropium (Combivent Respimat  Mcg) 1 puff RTQID INH  Last 

administered on 1/27/21at 20:00;  Start 1/25/21 at 20:00


Ceftriaxone Sodium 1 gm/ Sodium Chloride 50 ml @  100 mls/hr Q24H IV  Last 

administered on 1/27/21at 20:52;  Start 1/25/21 at 20:00


Azithromycin 500 mg/Sodium Chloride 250 ml @  250 mls/hr Q24H IV  Last 

administered on 1/27/21at 22:15;  Start 1/25/21 at 21:00


Dexamethasone Sodium Phosphate (Decadron) 6 mg BID IV  Last administered on 

1/27/21at 20:53;  Start 1/25/21 at 21:00


Diltiazem HCl (Diltiazem 24hr Cd) 300 mg 1X  ONCE PO  Last administered on 

1/26/21at 09:34;  Start 1/26/21 at 09:00;  Stop 1/26/21 at 09:09;  Status DC


Aspirin (Aspirin Chewable) 81 mg DAILY PO  Last administered on 1/27/21at 08:32;

 Start 1/26/21 at 14:00


Finasteride (Proscar) 5 mg HS PO  Last administered on 1/27/21at 20:52;  Start 

1/26/21 at 21:00


Tamsulosin HCl (Flomax) 0.4 mg HS PO  Last administered on 1/27/21at 20:52;  St

art 1/26/21 at 21:00


Atorvastatin Calcium (Lipitor) 40 mg QHS PO  Last administered on 1/27/21at 

20:52;  Start 1/26/21 at 21:00


Vitamin D (Vitamin D3) 2,000 unit DAILY PO  Last administered on 1/27/21at 

08:31;  Start 1/26/21 at 14:00


Cyanocobalamin (Vitamin B-12) 500 mcg DAILY PO  Last administered on 1/27/21at 

08:32;  Start 1/26/21 at 14:00


Diltiazem HCl (Cardizem 24hr Cd) 180 mg DAILY PO  Last administered on 1/27/21at

08:32;  Start 1/26/21 at 14:00


Multivitamins/ Calcium (Thera-M Plus) 1 tab DAILY PO  Last administered on 

1/27/21at 08:32;  Start 1/26/21 at 14:00


Psyllium Hydrophilic Mucilloid (Metamucil) 1 pkt BID PO  Last administered on 

1/27/21at 20:52;  Start 1/26/21 at 21:00


Acetaminophen (Tylenol) 1,000 mg PRN Q6HRS  PRN PO PAIN Last administered on 

1/27/21at 20:53;  Start 1/26/21 at 14:00


Guaifenesin (MUCINEX ER with DM) 1 tab PRN BID  PRN PO cough and congestion Last

administered on 1/26/21at 14:23;  Start 1/26/21 at 14:00


Cetirizine HCl (ZyrTEC) 10 mg DAILY PO  Last administered on 1/27/21at 08:31;  

Start 1/26/21 at 14:00


Pseudoephedrine HCl (Sudafed 12-Hour) 120 mg BID PO  Last administered on 

1/27/21at 20:53;  Start 1/26/21 at 14:00


Lactobacillus Rhamnosus (Culturelle) 1 cap BID PO  Last administered on 

1/27/21at 20:52;  Start 1/27/21 at 09:00


Diphenhydramine HCl (Benadryl) 25 mg PRN QHS  PRN PO INSOMNIA Last administered 

on 1/27/21at 23:30;  Start 1/27/21 at 21:15





Active Scripts


Active


Reported


Mucinex Dm Er 600-30 Mg Tablet (Guaifenesin/Dextromethorphan) 1 Each Tab.er.12h 

1 Tab PO PRN BID PRN 14 Days


Tylenol (Acetaminophen) 325 Mg Tablet 1,000 Mg PO PRN Q6HRS PRN


Claritin-D 24 Hour Tablet (Loratadine/Pseudoephedrine) 1 Each Tab.er.24h 1 Tab 

PO DAILY 10 Days


Coreg  ** (Carvedilol) 6.25 Mg Tablet 6.25 Mg PO BIDWMEALS


Lipitor (Atorvastatin Calcium) 40 Mg Tablet 40 Mg PO QHS


Aspirin 81 Mg Tab.chew 81 Mg PO DAILY


D3-2000 (Cholecalciferol (Vitamin D3)) 50 Mcg Capsule 50 Mcg PO DAILY


Diltiazem 24Hr ER (Diltiazem HCl) 180 Mg Tab.er.24h 180 Mg PO DAILY


Metamucil (Psyllium Husk) 0.52 Gm Capsule 0.52 Gm PO BID


Multi Vitamin Daily (Multivitamin) 1 Each Tablet 1 Each PO DAILY


Flomax (Tamsulosin Hcl) 0.4 Mg Cap.er.24h 0.4 Mg PO DAILY


Proscar (Finasteride) 5 Mg Tablet 5 Mg PO DAILY


B-12 (Cyanocobalamin (Vitamin B-12)) 500 Mcg Tablet 500 Mcg PO DAILY


Cyclobenzaprine Hcl 10 Mg Tablet 10 Mg PO TID PRN PRN





ALLERGIES


Allergies:  


Coded Allergies:  


     No Known Drug Allergies (Unverified , 1/25/21)





ROS


Review of Systems


14 point ROS conducted with pertinent positives noted above in hPi





PHYSICAL EXAM


General:  Alert, Oriented X3, Cooperative, No acute distress


HEENT:  Atraumatic, Mucous membr. moist/pink


Lungs:  Other (on NC)


Heart:  Regular rate, Other (2/6 systolic murmur )


Abdomen:  Soft


Extremities:  Normal pulses


Skin:  No breakdown


Neuro:  Normal speech, Sensation intact


Psych/Mental Status:  Mental status NL, Mood NL


MUSCULOSKELETAL:  Osteoarthritic changes both hands





VITALS


Vital Signs





Vital Signs








  Date Time  Temp Pulse Resp B/P (MAP) Pulse Ox O2 Delivery O2 Flow Rate FiO2


 


1/28/21 06:13 97.5 87 22 127/68 (87) 86 Room Air  


 


1/27/21 06:08       2.0 











LABS


LABS





Laboratory Tests








Test


 1/27/21


05:55 1/27/21


05:59 1/27/21


13:17 1/28/21


06:01


 


Sodium Level


 137 mmol/L


(136-145) 


 


 136 mmol/L


(136-145)


 


Potassium Level


 4.5 mmol/L


(3.5-5.1) 


 


 4.8 mmol/L


(3.5-5.1)


 


Chloride Level


 104 mmol/L


() 


 


 104 mmol/L


()


 


Carbon Dioxide Level


 24 mmol/L


(21-32) 


 


 23 mmol/L


(21-32)


 


Anion Gap 9 (6-14)    9 (6-14) 


 


Blood Urea Nitrogen


 17 mg/dL


(8-26) 


 


 20 mg/dL


(8-26)


 


Creatinine


 0.9 mg/dL


(0.7-1.3) 


 


 0.9 mg/dL


(0.7-1.3)


 


Estimated GFR


(Cockcroft-Gault) 82.9 


 


 


 82.9 





 


BUN/Creatinine Ratio 19 (6-20)    22 (6-20) 


 


Glucose Level


 152 mg/dL


(70-99) 


 


 136 mg/dL


(70-99)


 


Calcium Level


 8.8 mg/dL


(8.5-10.1) 


 


 8.7 mg/dL


(8.5-10.1)


 


Total Bilirubin


 0.5 mg/dL


(0.2-1.0) 


 


 0.5 mg/dL


(0.2-1.0)


 


Aspartate Amino Transf


(AST/SGOT) 41 U/L (15-37) 


 


 


 42 U/L (15-37) 





 


Alanine Aminotransferase


(ALT/SGPT) 188 U/L


(16-63) 


 


 187 U/L


(16-63)


 


Alkaline Phosphatase


 85 U/L


() 


 


 78 U/L


()


 


Total Protein


 6.8 g/dL


(6.4-8.2) 


 


 6.4 g/dL


(6.4-8.2)


 


Albumin


 2.7 g/dL


(3.4-5.0) 


 


 2.6 g/dL


(3.4-5.0)


 


Albumin/Globulin Ratio 0.7 (1.0-1.7)    0.7 (1.0-1.7) 


 


White Blood Count


 


 16.6 x10^3/uL


(4.0-11.0) 


 





 


Red Blood Count


 


 4.28 x10^6/uL


(4.30-5.70) 


 





 


Hemoglobin


 


 13.0 g/dL


(13.0-17.5) 


 





 


Hematocrit


 


 38.3 %


(39.0-53.0) 


 





 


Mean Corpuscular Volume  89 fL ()   


 


Mean Corpuscular Hemoglobin  30 pg (25-35)   


 


Mean Corpuscular Hemoglobin


Concent 


 34 g/dL


(31-37) 


 





 


Red Cell Distribution Width


 


 13.2 %


(11.5-14.5) 


 





 


Platelet Count


 


 407 x10^3/uL


(140-400) 


 





 


Neutrophils (%) (Auto)  90 % (31-73)   


 


Lymphocytes (%) (Auto)  7 % (24-48)   


 


Monocytes (%) (Auto)  3 % (0-9)   


 


Eosinophils (%) (Auto)  0 % (0-3)   


 


Basophils (%) (Auto)  0 % (0-3)   


 


Neutrophils # (Auto)


 


 15.0 x10^3uL


(1.8-7.7) 


 





 


Lymphocytes # (Auto)


 


 1.1 x10^3/uL


(1.0-4.8) 


 





 


Monocytes # (Auto)


 


 0.5 x10^3/uL


(0.0-1.1) 


 





 


Eosinophils # (Auto)


 


 0.0 x10^3/uL


(0.0-0.7) 


 





 


Basophils # (Auto)


 


 0.0 x10^3/uL


(0.0-0.2) 


 





 


Segmented Neutrophils %  91 % (35-66)   


 


Lymphocytes %  5 % (24-48)   


 


Monocytes %  4 % (0-10)   


 


Toxic Granulation  Present   


 


Toxic Vacuolation  Present   


 


Platelet Estimate


 


 Increased


(ADEQUATE) 


 





 


D-Dimer (Linda)


 


 


 0.69 mg/L


(0.00-0.50) 





 


NT-Pro-B-Type Natriuretic


Peptide 


 


 


 90 pg/mL


(0-124)











ASSESSMENT/PLAN


Assessment/Plan


1.  Acute respiratory failure with COVID PNA. CXR with bilateral infiltrates. NT

Pro BNP 90. Doubt overt HF


2.  Hypertension; controlled 


3.  Hyperlipidemia 


4.  H/o of AS; Follows with Dr. Herman with Replaced by [facility] cardiology 


5.  H/o possible arrhythmia; unclear. Patient denies any history of AFIB. Is on 

Cardizem for rate control. Has been SR/ST since admission. Sinus tachycardia 

would be expected in the setting of COVID PNA. Presently SR. 








Recommendations





Continue Cardizem for rate control


ASA therapy 


Ongoing lung optimization, treatment of COVID PNA


Supportive care


Outpatient echo when recovered from COVID


Follow up with Dr. Herman upon discharge.











PHILOMENA LI           Jan 28, 2021 08:30

## 2021-01-28 NOTE — RAD
CT angiogram of the chest with contrast:



Reason for examination: Worsening shortness of breath and hypoxia.



Helical images were obtained through the chest with intravenous administration 100 cc Omnipaque 350 u
sing PE protocol. 3-D MIPS reconstruction was performed in sagittal and coronal planes.



Exposure: One or more of the following individualized dose reduction techniques were utilized for thi
s examination:  1. Automated exposure control  2. Adjustment of the mA and/or kV according to patient
 size  3. Use of iterative reconstruction technique.



No abnormality seen at the thyroid gland. The trachea and mainstem bronchi show no intraluminal lesio
ns. No abnormality seen at the esophagus. The thoracic aorta shows no aneurysmal dilatation or dissec
tion. The heart size is normal with no pericardial effusion. A few small lymph nodes are seen in the 
mediastinum which are probably reactive. No pulmonary emboli are identified. The lung fields however 
show diffuse patchy interstitial and alveolar opacities which are predominantly peripheral location. 
No pleural effusions are seen. There is no pneumothorax. No acute bony abnormalities are seen. There 
are hypertrophic changes in the spine. There is a striated appearance to the T9 vertebral body sugges
tion a hemangioma. No acute bony abnormalities are seen.



There is a tiny hypodense lesion in the left lobe of the liver measuring 6 mm in size which has benig
n appearance and may represent a small cyst or hemangioma. No abnormality seen at the spleen, adrenal
 glands or pancreas. The gallbladder is surgically absent.



IMPRESSION:



No evidence of pulmonary embolus. 

Diffuse bilateral interstitial and alveolar opacities. Pneumonia is suspected.

Small 6 mm hypodense lesion in the left lobe liver which may represent a small cyst or hemangioma.

Striated bone changes in the T9 vertebral body consistent with a hemangioma.



Electronically signed by: Deya Briseno MD (1/28/2021 7:09 PM) [facility]

## 2021-01-28 NOTE — RAD
EXAM: CHEST ONE VIEW.



HISTORY: Increasing hypoxia.



COMPARISON: 01/27/2021.



FINDINGS: A frontal view of the chest is obtained.



Bilateral peripheral predominant interstitial and airspace opacities have increased mildly since 01/2
7/2021. There is no pneumothorax or pleural effusion. The heart is not enlarged. There are atheroscle
rotic calcifications of the aorta.



IMPRESSION: 

1. Bilateral multifocal infiltrates are slightly improved since the prior study.



Electronically signed by: BEKAH Gamboa MD (1/28/2021 2:44 PM) [id number]

## 2021-01-29 VITALS — SYSTOLIC BLOOD PRESSURE: 145 MMHG | DIASTOLIC BLOOD PRESSURE: 80 MMHG

## 2021-01-29 VITALS — DIASTOLIC BLOOD PRESSURE: 75 MMHG | SYSTOLIC BLOOD PRESSURE: 128 MMHG

## 2021-01-29 VITALS — SYSTOLIC BLOOD PRESSURE: 119 MMHG | DIASTOLIC BLOOD PRESSURE: 74 MMHG

## 2021-01-29 VITALS — SYSTOLIC BLOOD PRESSURE: 120 MMHG | DIASTOLIC BLOOD PRESSURE: 70 MMHG

## 2021-01-29 VITALS — SYSTOLIC BLOOD PRESSURE: 128 MMHG | DIASTOLIC BLOOD PRESSURE: 73 MMHG

## 2021-01-29 LAB
ALBUMIN SERPL-MCNC: 2.6 G/DL (ref 3.4–5)
ALBUMIN/GLOB SERPL: 0.7 {RATIO} (ref 1–1.7)
ALP SERPL-CCNC: 77 U/L (ref 46–116)
ALT SERPL-CCNC: 226 U/L (ref 16–63)
ANION GAP SERPL CALC-SCNC: 10 MMOL/L (ref 6–14)
AST SERPL-CCNC: 48 U/L (ref 15–37)
BILIRUB SERPL-MCNC: 0.6 MG/DL (ref 0.2–1)
BUN/CREAT SERPL: 24 (ref 6–20)
CA-I SERPL ISE-MCNC: 22 MG/DL (ref 8–26)
CALCIUM SERPL-MCNC: 8.7 MG/DL (ref 8.5–10.1)
CHLORIDE SERPL-SCNC: 103 MMOL/L (ref 98–107)
CO2 SERPL-SCNC: 24 MMOL/L (ref 21–32)
CREAT SERPL-MCNC: 0.9 MG/DL (ref 0.7–1.3)
CRP SERPL-MCNC: 2.1 MG/L (ref 0–3.3)
ERYTHROCYTE [DISTWIDTH] IN BLOOD BY AUTOMATED COUNT: 13.1 % (ref 11.5–14.5)
GFR SERPLBLD BASED ON 1.73 SQ M-ARVRAT: 82.9 ML/MIN
GLOBULIN SER-MCNC: 4 G/DL (ref 2.2–3.8)
GLUCOSE SERPL-MCNC: 138 MG/DL (ref 70–99)
HCT VFR BLD CALC: 38.5 % (ref 39–53)
HGB BLD-MCNC: 12.8 G/DL (ref 13–17.5)
MCH RBC QN AUTO: 30 PG (ref 25–35)
MCHC RBC AUTO-ENTMCNC: 33 G/DL (ref 31–37)
MCV RBC AUTO: 91 FL (ref 79–100)
PLATELET # BLD AUTO: 404 X10^3/UL (ref 140–400)
POTASSIUM SERPL-SCNC: 4.8 MMOL/L (ref 3.5–5.1)
PROT SERPL-MCNC: 6.6 G/DL (ref 6.4–8.2)
RBC # BLD AUTO: 4.21 X10^6/UL (ref 4.3–5.7)
SODIUM SERPL-SCNC: 137 MMOL/L (ref 136–145)
WBC # BLD AUTO: 14.9 X10^3/UL (ref 4–11)

## 2021-01-29 RX ADMIN — IPRATROPIUM BROMIDE AND ALBUTEROL SCH PUFF: 20; 100 SPRAY, METERED RESPIRATORY (INHALATION) at 20:00

## 2021-01-29 RX ADMIN — IPRATROPIUM BROMIDE AND ALBUTEROL SCH PUFF: 20; 100 SPRAY, METERED RESPIRATORY (INHALATION) at 12:00

## 2021-01-29 RX ADMIN — DEXAMETHASONE SODIUM PHOSPHATE SCH MG: 10 INJECTION INTRAMUSCULAR; INTRAVENOUS at 21:11

## 2021-01-29 RX ADMIN — FINASTERIDE SCH MG: 5 TABLET, FILM COATED ORAL at 21:12

## 2021-01-29 RX ADMIN — IPRATROPIUM BROMIDE AND ALBUTEROL SCH PUFF: 20; 100 SPRAY, METERED RESPIRATORY (INHALATION) at 16:00

## 2021-01-29 RX ADMIN — ENOXAPARIN SODIUM SCH MG: 100 INJECTION SUBCUTANEOUS at 20:13

## 2021-01-29 RX ADMIN — ATORVASTATIN CALCIUM SCH MG: 20 TABLET, FILM COATED ORAL at 21:12

## 2021-01-29 RX ADMIN — PSEUDOEPHEDRINE HYDROCHLORIDE SCH MG: 120 TABLET, FILM COATED, EXTENDED RELEASE ORAL at 21:00

## 2021-01-29 RX ADMIN — GUAIFENESIN AND DEXTROMETHORPHAN HYDROBROMIDE PRN TAB: 600; 30 TABLET, EXTENDED RELEASE ORAL at 08:46

## 2021-01-29 RX ADMIN — Medication SCH PKT: at 21:12

## 2021-01-29 RX ADMIN — IPRATROPIUM BROMIDE AND ALBUTEROL SCH PUFF: 20; 100 SPRAY, METERED RESPIRATORY (INHALATION) at 08:38

## 2021-01-29 RX ADMIN — VITAMIN D, TAB 1000IU (100/BT) SCH UNIT: 25 TAB at 08:40

## 2021-01-29 RX ADMIN — CETIRIZINE HYDROCHLORIDE SCH MG: 10 TABLET, FILM COATED ORAL at 08:39

## 2021-01-29 RX ADMIN — Medication SCH CAP: at 08:39

## 2021-01-29 RX ADMIN — TAMSULOSIN HYDROCHLORIDE SCH MG: 0.4 CAPSULE ORAL at 21:12

## 2021-01-29 RX ADMIN — ASPIRIN 81 MG SCH MG: 81 TABLET ORAL at 08:40

## 2021-01-29 RX ADMIN — Medication SCH PKT: at 08:38

## 2021-01-29 RX ADMIN — MULTIPLE VITAMINS W/ MINERALS TAB SCH TAB: TAB at 08:39

## 2021-01-29 RX ADMIN — Medication SCH MCG: at 08:40

## 2021-01-29 RX ADMIN — Medication SCH CAP: at 21:11

## 2021-01-29 RX ADMIN — PSEUDOEPHEDRINE HYDROCHLORIDE SCH MG: 120 TABLET, FILM COATED, EXTENDED RELEASE ORAL at 08:39

## 2021-01-29 RX ADMIN — DEXAMETHASONE SODIUM PHOSPHATE SCH MG: 10 INJECTION INTRAMUSCULAR; INTRAVENOUS at 08:41

## 2021-01-29 RX ADMIN — AZITHROMYCIN DIHYDRATE SCH MLS/HR: 500 INJECTION, POWDER, LYOPHILIZED, FOR SOLUTION INTRAVENOUS at 21:13

## 2021-01-29 NOTE — PN
DATE:  01/29/2021



SUBJECTIVE:  The patient continues to be short of breath, unable to complete

sentences and he also desaturates on exertion, but did have cough with scanty

whitish sputum.  Denied any chills, rigors or fever.



PHYSICAL EXAMINATION:

GENERAL:  When I examined him, he looked well and was clearly in no apparent

respiratory distress.  No pallor, jaundice, cyanosis, or thyromegaly.  No

jugular venous distension.  No lower limb edema.

VITAL SIGNS:  Her heart rate was 98, blood pressure was 120/70, temperature was

99.8, respiratory rate was 20, and oxygen saturation was 93% on 2 liters of

oxygen.

HEAD, EYES, EARS, NOSE AND THROAT:  Showed normocephalic, atraumatic.

NECK:  Supple.

HEART:  Showed normal first and second heart sounds.  No gallop or murmur.

CHEST:  Clear to auscultation.  No crepitation or rhonchi.

ABDOMEN:  Distended, soft, nontender.

NEUROLOGIC:  He is awake, alert, responding appropriately.  All cranial nerves

are intact.  He moves extremities without difficulty.



His intake over the last 24 hours was 2075.



LABORATORY DATA:  His lab work showed a white cell count of 14,900; hemoglobin

12.8; hematocrit 38.5; MCV 91; and platelet count of 404,000.  Her chemistry

showed a serum sodium 137, potassium 4.8, chloride 103, bicarbonate 24, anion

gap of 10, BUN 22, creatinine 0.9, estimated GFR was 83 mL per minute, his

glucose 138, calcium was 8.7.  Total bilirubin and alkaline phosphatase normal. 

AST, ALT slightly elevated, fluctuating.  His total protein was 6.8, albumin was

2.6.  His D-dimer is down slightly to 0.64 and his coronavirus PCR was not

detectable.  His influenza A and B were negative.



ASSESSMENT:

1.  Recent COVID pneumonia with possible lung injury.

2.  Bilateral atypical pneumonia.

3.  Chronic interstitial lung disease.

4.  Hypertension.

5.  Hyperlipidemia.



PLAN:  To continue with antibiotic.  Continue with steroids.  Continue with DVT

prophylaxis.  I will continue with tamsulosin and finasteride for benign

prostatic hypertrophy.  He has marked sinus tachycardia, probably related to his

bronchodilator and pseudoephedrine.  I will see if we can discontinue

pseudoephedrine and see how he does without it.





______________________________

JANI RAMIREZ MD



DR:  Alissa  JOB#:  [id number] / [id number]

DD:  01/29/2021 16:00  DT:  01/29/2021 18:06

## 2021-01-30 VITALS — SYSTOLIC BLOOD PRESSURE: 141 MMHG | DIASTOLIC BLOOD PRESSURE: 75 MMHG

## 2021-01-30 VITALS — SYSTOLIC BLOOD PRESSURE: 135 MMHG | DIASTOLIC BLOOD PRESSURE: 75 MMHG

## 2021-01-30 VITALS — SYSTOLIC BLOOD PRESSURE: 115 MMHG | DIASTOLIC BLOOD PRESSURE: 69 MMHG

## 2021-01-30 VITALS — DIASTOLIC BLOOD PRESSURE: 86 MMHG | SYSTOLIC BLOOD PRESSURE: 151 MMHG

## 2021-01-30 RX ADMIN — VITAMIN D, TAB 1000IU (100/BT) SCH UNIT: 25 TAB at 08:08

## 2021-01-30 RX ADMIN — ASPIRIN 81 MG SCH MG: 81 TABLET ORAL at 08:08

## 2021-01-30 RX ADMIN — ENOXAPARIN SODIUM SCH MG: 100 INJECTION SUBCUTANEOUS at 18:42

## 2021-01-30 RX ADMIN — TAMSULOSIN HYDROCHLORIDE SCH MG: 0.4 CAPSULE ORAL at 22:12

## 2021-01-30 RX ADMIN — IPRATROPIUM BROMIDE AND ALBUTEROL SCH PUFF: 20; 100 SPRAY, METERED RESPIRATORY (INHALATION) at 20:00

## 2021-01-30 RX ADMIN — IPRATROPIUM BROMIDE AND ALBUTEROL SCH PUFF: 20; 100 SPRAY, METERED RESPIRATORY (INHALATION) at 12:13

## 2021-01-30 RX ADMIN — Medication SCH PKT: at 22:13

## 2021-01-30 RX ADMIN — IPRATROPIUM BROMIDE AND ALBUTEROL SCH PUFF: 20; 100 SPRAY, METERED RESPIRATORY (INHALATION) at 10:29

## 2021-01-30 RX ADMIN — CETIRIZINE HYDROCHLORIDE SCH MG: 10 TABLET, FILM COATED ORAL at 08:06

## 2021-01-30 RX ADMIN — DEXAMETHASONE SODIUM PHOSPHATE SCH MG: 10 INJECTION INTRAMUSCULAR; INTRAVENOUS at 22:12

## 2021-01-30 RX ADMIN — Medication SCH PKT: at 08:21

## 2021-01-30 RX ADMIN — Medication SCH CAP: at 22:11

## 2021-01-30 RX ADMIN — Medication SCH CAP: at 08:22

## 2021-01-30 RX ADMIN — IPRATROPIUM BROMIDE AND ALBUTEROL SCH PUFF: 20; 100 SPRAY, METERED RESPIRATORY (INHALATION) at 18:42

## 2021-01-30 RX ADMIN — ATORVASTATIN CALCIUM SCH MG: 20 TABLET, FILM COATED ORAL at 22:11

## 2021-01-30 RX ADMIN — MULTIPLE VITAMINS W/ MINERALS TAB SCH TAB: TAB at 08:08

## 2021-01-30 RX ADMIN — Medication SCH MCG: at 08:08

## 2021-01-30 RX ADMIN — PSEUDOEPHEDRINE HYDROCHLORIDE SCH MG: 120 TABLET, FILM COATED, EXTENDED RELEASE ORAL at 08:22

## 2021-01-30 RX ADMIN — DEXAMETHASONE SODIUM PHOSPHATE SCH MG: 10 INJECTION INTRAMUSCULAR; INTRAVENOUS at 08:21

## 2021-01-30 RX ADMIN — FINASTERIDE SCH MG: 5 TABLET, FILM COATED ORAL at 22:12

## 2021-01-30 RX ADMIN — AZITHROMYCIN DIHYDRATE SCH MLS/HR: 500 INJECTION, POWDER, LYOPHILIZED, FOR SOLUTION INTRAVENOUS at 21:00

## 2021-01-30 NOTE — PN
DATE:  01/30/2021



SUBJECTIVE:  The patient continued to complain of shortness of breath. 

Sometimes he ____ to finish a sentence, continued to saturate down to 82% on

exertion and while on room air, noted to have cough with scanty whitish sputum.



PHYSICAL EXAMINATION:

GENERAL:  When I examined him, he looked somewhat pale, but no jaundice,

cyanosis or thyromegaly.  No jugular venous distention.  No limb edema.

VITAL SIGNS:  His heart rate was 76, blood pressure was 115/69, temperature was

97.7, respiratory rate 20, and oxygen saturation was 94% on 2 liters of oxygen.

HEAD, EYES, EARS, NOSE, AND THROAT:  Showed normocephalic, atraumatic.

NECK:  Supple.

HEART:  Showed normal first and second heart sounds.  No gallop, rub or murmur.

CHEST:  Showed central trachea, equal bilateral chest expansion, air entry,

vesicular sounds.  No crepitation or rhonchi.

ABDOMEN:  Distended, soft, nontender.

NEUROLOGIC:  He is grossly intact.



His intake over the last 24 hours was 1500, no output was recorded.



LABORATORY DATA:  His most recent D-dimer was 0.64, hemoglobin 13, hematocrit 39

with a white cell count 14,900 and platelets 104.  His chemistry is unremarkable

except slightly elevated AST, ALT.



ASSESSMENT:

1.  Recent COVID pneumonia with possible lung injury.

2.  Bilateral atypical pneumonia.

3.  Chronic interstitial lung disease.

4.  Hypertension.

5.  Hyperlipidemia.



PLAN:  To continue antibiotic.  Continue with steroids.  Continue with DVT

prophylaxis.  We will do a 6-minute walk tomorrow to qualify him for his

likelihood that probably will require oxygen at least for the foreseeable

future.





______________________________

JANI RAMIREZ MD



DR:  HARJEET/jeremy  JOB#:  [id number] / [id number]

DD:  01/30/2021 11:49  DT:  01/30/2021 12:03

## 2021-01-31 VITALS — DIASTOLIC BLOOD PRESSURE: 79 MMHG | SYSTOLIC BLOOD PRESSURE: 137 MMHG

## 2021-01-31 VITALS — DIASTOLIC BLOOD PRESSURE: 79 MMHG | SYSTOLIC BLOOD PRESSURE: 132 MMHG

## 2021-01-31 VITALS — DIASTOLIC BLOOD PRESSURE: 82 MMHG | SYSTOLIC BLOOD PRESSURE: 126 MMHG

## 2021-01-31 VITALS — SYSTOLIC BLOOD PRESSURE: 149 MMHG | DIASTOLIC BLOOD PRESSURE: 86 MMHG

## 2021-01-31 VITALS — DIASTOLIC BLOOD PRESSURE: 77 MMHG | SYSTOLIC BLOOD PRESSURE: 138 MMHG

## 2021-01-31 LAB
ALBUMIN SERPL-MCNC: 2.7 G/DL (ref 3.4–5)
ALBUMIN/GLOB SERPL: 0.8 {RATIO} (ref 1–1.7)
ALP SERPL-CCNC: 67 U/L (ref 46–116)
ALT SERPL-CCNC: 179 U/L (ref 16–63)
ANION GAP SERPL CALC-SCNC: 12 MMOL/L (ref 6–14)
AST SERPL-CCNC: 28 U/L (ref 15–37)
BILIRUB SERPL-MCNC: 0.8 MG/DL (ref 0.2–1)
BUN/CREAT SERPL: 31 (ref 6–20)
CA-I SERPL ISE-MCNC: 28 MG/DL (ref 8–26)
CALCIUM SERPL-MCNC: 9 MG/DL (ref 8.5–10.1)
CHLORIDE SERPL-SCNC: 102 MMOL/L (ref 98–107)
CO2 SERPL-SCNC: 22 MMOL/L (ref 21–32)
CREAT SERPL-MCNC: 0.9 MG/DL (ref 0.7–1.3)
ERYTHROCYTE [DISTWIDTH] IN BLOOD BY AUTOMATED COUNT: 13.2 % (ref 11.5–14.5)
GFR SERPLBLD BASED ON 1.73 SQ M-ARVRAT: 82.9 ML/MIN
GLOBULIN SER-MCNC: 3.6 G/DL (ref 2.2–3.8)
GLUCOSE SERPL-MCNC: 144 MG/DL (ref 70–99)
HCT VFR BLD CALC: 39.8 % (ref 39–53)
HGB BLD-MCNC: 13.1 G/DL (ref 13–17.5)
MCH RBC QN AUTO: 30 PG (ref 25–35)
MCHC RBC AUTO-ENTMCNC: 33 G/DL (ref 31–37)
MCV RBC AUTO: 91 FL (ref 79–100)
PLATELET # BLD AUTO: 378 X10^3/UL (ref 140–400)
POTASSIUM SERPL-SCNC: 4.5 MMOL/L (ref 3.5–5.1)
PROT SERPL-MCNC: 6.3 G/DL (ref 6.4–8.2)
RBC # BLD AUTO: 4.37 X10^6/UL (ref 4.3–5.7)
SODIUM SERPL-SCNC: 136 MMOL/L (ref 136–145)
WBC # BLD AUTO: 16.4 X10^3/UL (ref 4–11)

## 2021-01-31 RX ADMIN — TAMSULOSIN HYDROCHLORIDE SCH MG: 0.4 CAPSULE ORAL at 21:04

## 2021-01-31 RX ADMIN — Medication SCH PKT: at 08:42

## 2021-01-31 RX ADMIN — FINASTERIDE SCH MG: 5 TABLET, FILM COATED ORAL at 21:04

## 2021-01-31 RX ADMIN — IPRATROPIUM BROMIDE AND ALBUTEROL SCH PUFF: 20; 100 SPRAY, METERED RESPIRATORY (INHALATION) at 09:54

## 2021-01-31 RX ADMIN — DEXAMETHASONE SODIUM PHOSPHATE SCH MG: 10 INJECTION INTRAMUSCULAR; INTRAVENOUS at 08:41

## 2021-01-31 RX ADMIN — IPRATROPIUM BROMIDE AND ALBUTEROL SCH PUFF: 20; 100 SPRAY, METERED RESPIRATORY (INHALATION) at 12:06

## 2021-01-31 RX ADMIN — CETIRIZINE HYDROCHLORIDE SCH MG: 10 TABLET, FILM COATED ORAL at 08:40

## 2021-01-31 RX ADMIN — Medication SCH MCG: at 08:41

## 2021-01-31 RX ADMIN — CEFDINIR SCH MG: 300 CAPSULE ORAL at 21:04

## 2021-01-31 RX ADMIN — IPRATROPIUM BROMIDE AND ALBUTEROL SCH PUFF: 20; 100 SPRAY, METERED RESPIRATORY (INHALATION) at 16:15

## 2021-01-31 RX ADMIN — IPRATROPIUM BROMIDE AND ALBUTEROL SCH PUFF: 20; 100 SPRAY, METERED RESPIRATORY (INHALATION) at 20:00

## 2021-01-31 RX ADMIN — ASPIRIN 81 MG SCH MG: 81 TABLET ORAL at 09:54

## 2021-01-31 RX ADMIN — Medication SCH CAP: at 21:04

## 2021-01-31 RX ADMIN — VITAMIN D, TAB 1000IU (100/BT) SCH UNIT: 25 TAB at 08:39

## 2021-01-31 RX ADMIN — ENOXAPARIN SODIUM SCH MG: 100 INJECTION SUBCUTANEOUS at 18:01

## 2021-01-31 RX ADMIN — MULTIPLE VITAMINS W/ MINERALS TAB SCH TAB: TAB at 08:40

## 2021-01-31 RX ADMIN — Medication SCH CAP: at 08:39

## 2021-01-31 RX ADMIN — Medication SCH PKT: at 21:03

## 2021-01-31 RX ADMIN — ATORVASTATIN CALCIUM SCH MG: 20 TABLET, FILM COATED ORAL at 21:04

## 2021-01-31 NOTE — PN
DATE:  01/31/2021



SUBJECTIVE:  The patient is resting, slightly propped up in bed, in no apparent

distress.  He feels slightly better today.  His oxygen saturation is 95% on 2

liters of oxygen at rest.  He continued to desaturate on exertion and without

oxygen.



OBJECTIVE:

GENERAL:  When I examined him, he looked well and was clearly in no apparent

distress at rest, however, he continued to pause and unable to finish sentences,

although he is slightly better.  There was no pallor, jaundice, cyanosis or

thyromegaly.  No jugular venous distention.  No limb edema.

VITAL SIGNS:  His heart rate was 80, blood pressure was 132/79, temperature was

97.1, respiratory rate 20, and oxygen saturation was 93% on 2 liters of oxygen.

HEAD, EYES, EARS, NOSE AND THROAT:  Normocephalic, atraumatic.

NECK:  Supple.

HEART:  Showed normal first and second heart sounds.  No gallop, rub or murmur.

CHEST:  Clear to auscultation.  No crepitation or rhonchi.

ABDOMEN:  Distended, soft, nontender.

NEUROLOGIC:  He is awake, alert, responding appropriately.  All cranial nerves

are intact.  He ambulates without assistance or assistive devices.



LABORATORY DATA:  His lab work this morning showed a white cell count of 16,400,

hemoglobin 13, hematocrit 39, MCV 91, and platelet count 378,000.  His serum

sodium was 137, potassium 4.8, chloride 103, bicarbonate 24, anion gap of 10,

BUN 22, creatinine 0.9, estimated GFR was 83 mL per minute, his glucose 138,

calcium was 8.7.  Total bilirubin and alkaline phosphatase were normal.  AST,

ALT slightly elevated.  His D-dimer was down further to 0.45.  His coronavirus

PCR was not detectable and influenza A and B were negative.  His CT angio showed

diffuse bilateral interstitial and alveolar opacities, pneumonia suspected.  He

has no evidence of pulmonary embolism.



ASSESSMENT:

1.  Recent COVID pneumonia with possible lung injury.

2.  Bilateral atypical pneumonia.

3.  Chronic interstitial lung disease.

4.  Hypertension.

5.  Hyperlipidemia.



PLAN:  I will discontinue his Zithromax.  I will switch his ceftriaxone to

cefdinir and dexamethasone to be given orally and I will do 6-minute walk and

will be discharged home tomorrow hopefully with home oxygen.  I will also

arrange for him to be seen by Dr. Shaikh as an outpatient in his clinic at

[facility] for followup in 2 weeks time.





______________________________

JANI RAMIREZ MD



DR:  HARJEET/jeremy  JOB#:  [id number] / [id number]

DD:  01/31/2021 13:23  DT:  01/31/2021 16:30

## 2021-02-01 VITALS
DIASTOLIC BLOOD PRESSURE: 77 MMHG | DIASTOLIC BLOOD PRESSURE: 77 MMHG | DIASTOLIC BLOOD PRESSURE: 77 MMHG | SYSTOLIC BLOOD PRESSURE: 132 MMHG | SYSTOLIC BLOOD PRESSURE: 132 MMHG | SYSTOLIC BLOOD PRESSURE: 132 MMHG

## 2021-02-01 VITALS — SYSTOLIC BLOOD PRESSURE: 118 MMHG | DIASTOLIC BLOOD PRESSURE: 81 MMHG

## 2021-02-01 LAB
ANION GAP SERPL CALC-SCNC: 10 MMOL/L (ref 6–14)
CA-I SERPL ISE-MCNC: 24 MG/DL (ref 8–26)
CALCIUM SERPL-MCNC: 8.4 MG/DL (ref 8.5–10.1)
CHLORIDE SERPL-SCNC: 102 MMOL/L (ref 98–107)
CO2 SERPL-SCNC: 22 MMOL/L (ref 21–32)
CREAT SERPL-MCNC: 1 MG/DL (ref 0.7–1.3)
ERYTHROCYTE [DISTWIDTH] IN BLOOD BY AUTOMATED COUNT: 13.4 % (ref 11.5–14.5)
GFR SERPLBLD BASED ON 1.73 SQ M-ARVRAT: 73.5 ML/MIN
GLUCOSE SERPL-MCNC: 122 MG/DL (ref 70–99)
HCT VFR BLD CALC: 39.6 % (ref 39–53)
HGB BLD-MCNC: 13.2 G/DL (ref 13–17.5)
MCH RBC QN AUTO: 30 PG (ref 25–35)
MCHC RBC AUTO-ENTMCNC: 33 G/DL (ref 31–37)
MCV RBC AUTO: 90 FL (ref 79–100)
PLATELET # BLD AUTO: 344 X10^3/UL (ref 140–400)
POTASSIUM SERPL-SCNC: 4.8 MMOL/L (ref 3.5–5.1)
RBC # BLD AUTO: 4.38 X10^6/UL (ref 4.3–5.7)
SODIUM SERPL-SCNC: 134 MMOL/L (ref 136–145)
WBC # BLD AUTO: 19.3 X10^3/UL (ref 4–11)

## 2021-02-01 RX ADMIN — VITAMIN D, TAB 1000IU (100/BT) SCH UNIT: 25 TAB at 08:37

## 2021-02-01 RX ADMIN — Medication SCH CAP: at 08:38

## 2021-02-01 RX ADMIN — IPRATROPIUM BROMIDE AND ALBUTEROL SCH PUFF: 20; 100 SPRAY, METERED RESPIRATORY (INHALATION) at 08:38

## 2021-02-01 RX ADMIN — IPRATROPIUM BROMIDE AND ALBUTEROL SCH PUFF: 20; 100 SPRAY, METERED RESPIRATORY (INHALATION) at 12:40

## 2021-02-01 RX ADMIN — MULTIPLE VITAMINS W/ MINERALS TAB SCH TAB: TAB at 08:37

## 2021-02-01 RX ADMIN — CEFDINIR SCH MG: 300 CAPSULE ORAL at 08:37

## 2021-02-01 RX ADMIN — ASPIRIN 81 MG SCH MG: 81 TABLET ORAL at 08:34

## 2021-02-01 RX ADMIN — CETIRIZINE HYDROCHLORIDE SCH MG: 10 TABLET, FILM COATED ORAL at 08:37

## 2021-02-01 RX ADMIN — Medication SCH PKT: at 08:38

## 2021-02-01 RX ADMIN — Medication SCH MCG: at 08:37

## 2021-02-01 NOTE — DS
DATE OF DISCHARGE:  02/01/2021



HOSPITAL COURSE:  The patient is a 70-year-old  male patient who was

admitted through the Emergency Room to [facility] with worsening

shortness of breath.  On the day of admission, he tested positive about 11 days

prior to that.  His chest x-ray demonstrated chronic interstitial changes as

well as new diffuse interstitial ground glass appearance in both base.  The

patient was admitted with suspected coronavirus pneumonia.  He was started on IV

antibiotic and steroids.  Subsequent investigation showed that his coronavirus

by PCR was not detectable.  His influenza A and B were negative.  His D-dimer

was slightly high at 0.69 and he was treated with dexamethasone together with

cefdinir and Zithromax as well as Lovenox together with all his other

medications.  The patient continues to have some shortness of breath and

desaturates on exertion.  We did a 6-minute walk and he required 3 liters of

oxygen to maintain his oxygen saturation more than 92%.  As he remained stable,

a decision was made to discharge him home to continue a tapering course of

steroids to finish his antibiotic treatment and to go on home oxygen.  He will

have a concentrator and also portable oxygen tank.  We have also made an

appointment for him to be seen by the pulmonologist at [facility].



PHYSICAL EXAMINATION:

GENERAL:  When I saw him today, he looked well and was clearly in no apparent

respiratory distress.  There was no pallor, jaundice, cyanosis or thyromegaly. 

No jugular venous distention.  No lower limb edema.

VITAL SIGNS:  His heart rate was 81, blood pressure was 132/77, temperature was

98.2, respiratory rate was 22 and oxygen saturation was 94% on 3 liters of

oxygen.

HEAD, EYES, EARS, NOSE AND THROAT:  Showed normocephalic and atraumatic.

NECK:  Supple.

HEART:  Showed normal first and second heart sounds.  No gallop, rub or murmur.

CHEST:  Shows central trachea, equal bilateral chest expansion, air entry.  I

could not really appreciate any crepitation or rhonchi.

ABDOMEN:  Distended, soft, nontender.

NEUROLOGIC:  He is grossly intact.



LABORATORY DATA:  As of this morning, his white cell count was 19,300;

hemoglobin 13; hematocrit 39; MCV 90 and platelet count 344,000.  Serum sodium

was 134, potassium 4.8, chloride 102, bicarbonate 22, anion gap of 10, BUN 24,

creatinine 1, estimated GFR was 73 mL per minute.  His glucose 122, calcium was

8.4.  His total bilirubin and alkaline phosphatase is normal.  AST and ALT were

trending down.  In fact, his AST was normal and there is an ALT came down from

226 down to 179.  Total protein was 6.3, albumin was 2.7.  His BNP was only 90. 

His most recent D-dimer was down to 0.45 from 0.69.  His coronavirus by PCR was

not detectable.  His influenza A and B were negative.



DISCHARGE MEDICATIONS:  He was discharged home to continue on Zithromax 250 mg

once a day for 3 more days, cefdinir 300 mg twice a day for 7 days, and

dexamethasone on tapering fashion 6 mg once a day for 3 days, 4 mg once a day

for 3 days, and 2 mg once a day for 3 days.  He should continue on his aspirin

81 mg once a day, Tylenol 1000 mg every 6 hours as needed, atorvastatin calcium

40 mg at bedtime, carvedilol 6.25 mg twice a day, cholecalciferol (vitamin D3)

50 mcg capsules once a day, cyanocobalamin 500 mcg once a day, cyclobenzaprine

10 mg 3 times a day, diltiazem 180 mg daily, finasteride 5 mg at bedtime,

loratadine/pseudoephedrine was discontinued, multivitamin 1 tablet once a day,

Psyllium husk (Metamucil) 0.52 g p.o. b.i.d., and tamsulosin 0.4 mg at bedtime.



FINAL DISCHARGE DIAGNOSES:

1.  Acute hypoxic respiratory failure.

2.  Recent COVID-19 pneumonia with possible viral induced lung injury.

3.  Bilateral atypical pneumonia.

4.  Chronic interstitial lung disease.

5.  Hypertension.

6.  Hyperlipidemia.



to Harvinder Young as well as Dr. Gibson, the pulmonologist at [facility].





______________________________

JANI RAMIREZ MD



DR:  HARJEET/jeremy  JOB#:  [id number] / [id number]

DD:  02/01/2021 11:16  DT:  02/01/2021 12:02



THANH Gruber MD, GALEN MD

## 2021-04-02 ENCOUNTER — HOSPITAL ENCOUNTER (OUTPATIENT)
Dept: HOSPITAL 63 - CT | Age: 71
End: 2021-04-02
Payer: MEDICARE

## 2021-04-02 DIAGNOSIS — I25.10: ICD-10-CM

## 2021-04-02 DIAGNOSIS — J18.9: ICD-10-CM

## 2021-04-02 DIAGNOSIS — K76.9: ICD-10-CM

## 2021-04-02 DIAGNOSIS — R91.1: Primary | ICD-10-CM

## 2021-04-02 PROCEDURE — 71250 CT THORAX DX C-: CPT

## 2021-04-02 NOTE — RAD
EXAM: Chest CT without intravenous contrast.



HISTORY: Covid-19 pneumonia follow-up.



TECHNIQUE: Computed tomographic images of the chest were obtained without contrast. Multiplanar refor
matting was performed.



*One or more of the following individualized dose reduction techniques were utilized for this examina
tion:  

1. Automated exposure control.  

2. Adjustment of the mA and/or kV according to patient size.  

3. Use of iterative reconstruction technique.



COMPARISON: 1/28/2021.



FINDINGS: There has been interval decrease in previously demonstrated multifocal infiltrate. There is
 residual groundglass infiltrate in a predominantly peripheral distribution throughout both lungs. No
 residual consolidation is seen. There is no pleural effusion or pneumothorax. There is a stable 4 mm
 nodule at the right lung base. There are a few calcified granulomas.



The heart is normal in size. There is calcification of the aortic valve. There is no lymphadenopathy.
 There is stable tiny hypodense lesions within the liver, too small to characterize. The gallbladder 
is surgically absent. There is partial visualization of a prominent left renal pelvis, possibly due t
o an extrarenal pelvis. There are degenerative changes throughout the spine. There is thoracic scolio
sis. There is mild chronic loss of vertebral body height at the mid thoracic levels. There are benign
 osseous hemangiomas.



IMPRESSION: 

1. Decrease in previously demonstrated multifocal infiltrate. There is residual groundglass infiltrat
e throughout both lungs in a predominantly peripheral distribution. Continued follow-up is recommende
d to confirm complete resolution.

2. Stable 4 mm nodule within the right lung base. Is degenerative time of follow-up is recommended.

3. Stable ill-defined hypodense lesions within the liver. In the absence of known malignancy, these a
re likely cysts or hemangiomas.

4. Calcification of the aortic valve.



Electronically signed by: Sera Velez MD (4/2/2021 12:17 PM) [facility]

## 2021-09-01 ENCOUNTER — HOSPITAL ENCOUNTER (OUTPATIENT)
Dept: HOSPITAL 63 - CT | Age: 71
End: 2021-09-01
Payer: MEDICARE

## 2021-09-01 DIAGNOSIS — R91.1: Primary | ICD-10-CM

## 2021-09-01 DIAGNOSIS — Z90.49: ICD-10-CM

## 2021-09-01 DIAGNOSIS — I70.0: ICD-10-CM

## 2021-09-01 PROCEDURE — 71250 CT THORAX DX C-: CPT

## 2021-09-01 NOTE — RAD
PQRS Compliance Statement:



One or more of the following individualized dose reduction techniques were utilized for this examinat
ion:  

1. Automated exposure control  

2. Adjustment of the mA and/or kV according to patient size  

3. Use of iterative reconstruction technique



CT THORAX WO 9/1/2021 9:51 AM



Indication: COVID pneumonia in January



COMPARISON: None available.



TECHNIQUE: Multiple axial CT images of the chest were obtained without intravenous contrast. Coronal 
and sagittal reformats are provided.



FINDINGS:



Near complete resolution of consolidative changes along the subpleural distribution of the upper and 
lower lobes when compared to prior examination from 1/28/2021. Subpleural groundglass opacities with 
lower lung zone predominance appear marginally improved since 4/2/2021. There is minimal residual ret
icular interstitial thickening. Mild bronchial wall thickening suggestive of nonspecific bronchitis. 
No new or enlarging solid noncalcified pulmonary nodules. 4 mm solid noncalcified pulmonary nodule id
entified at the right lung base (series 2, image 77), stable. Stable calcified granuloma along the rubi
bpleural left lower lobe measuring 3 mm. 2 mm solid noncalcified pulmonary nodule in the left costoph
renic angle is stable (series 2, image 74). No pleural effusions, pulmonary vascular congestion or pn
eumothorax. Thyroid gland is normal in appearance. Heart size within normal limits. Thoracic aorta is
 normal in course and caliber. Calcific effusions are identified at the aortic root. Three-vessel cor
onary artery vascular calcific effusions are present. Calcified mediastinal and left hilar lymph node
s suggest sequela prior granulomatous exposure. Gallbladder surgically absent. Versus portions of the
 upper abdomen are otherwise normal. No suspicious osseous abnormality is identified. Osseous hemangi
tadeo is identified at T9.



IMPRESSION:



There is improving aeration of the lungs as compared to prior examination from 4/2/2021 and 1/28/2021
 suggestive of resolving pneumonitis. Minimal residual groundglass changes are identified with lower 
lung zone predominance. Minimal residual subpleural reticular interstitial changes could reflect scar
ring.



Stable solid noncalcified pulmonary nodule in the right costophrenic angle measuring 4 mm. Fleischner
 guidelines for incidentally detected pulmonary nodules suggests no routine follow-up for low risk pa
tients and optional CT at 12 months for high risk patients with solid noncalcified pulmonary nodules 
less than 6 mm in size.



Electronically signed by: Debby Swan MD (9/1/2021 10:21 AM) [id number]